# Patient Record
Sex: FEMALE | Race: WHITE | Employment: OTHER | URBAN - METROPOLITAN AREA
[De-identification: names, ages, dates, MRNs, and addresses within clinical notes are randomized per-mention and may not be internally consistent; named-entity substitution may affect disease eponyms.]

---

## 2021-09-27 ENCOUNTER — OFFICE VISIT (OUTPATIENT)
Dept: OBSTETRICS AND GYNECOLOGY | Facility: CLINIC | Age: 66
End: 2021-09-27

## 2021-09-27 VITALS
HEIGHT: 65 IN | BODY MASS INDEX: 28.32 KG/M2 | WEIGHT: 170 LBS | SYSTOLIC BLOOD PRESSURE: 131 MMHG | HEART RATE: 66 BPM | DIASTOLIC BLOOD PRESSURE: 82 MMHG

## 2021-09-27 DIAGNOSIS — N81.4 PELVIC RELAXATION DUE TO UTEROVAGINAL PROLAPSE: Primary | ICD-10-CM

## 2021-09-27 LAB
GLUCOSE UR STRIP-MCNC: NEGATIVE MG/DL
PROT UR STRIP-MCNC: NEGATIVE MG/DL

## 2021-09-27 PROCEDURE — 99203 OFFICE O/P NEW LOW 30 MIN: CPT | Performed by: OBSTETRICS & GYNECOLOGY

## 2021-09-27 PROCEDURE — 81002 URINALYSIS NONAUTO W/O SCOPE: CPT | Performed by: OBSTETRICS & GYNECOLOGY

## 2021-09-27 NOTE — PROGRESS NOTES
"NEW GYN Visit    CC:bulge in vagina    HPI:   66 y.o.  s/p menapause since age 44 presents c/o of something coming out of vagina and vaginal varicosity.  Pt stats she has had 4 's largest 9lb 6oz.  She denies stress urinary incontinence or splinting with bowel movements.  She stats they moved from [facility] in .  She did a lot of lifting and packing boxes.  She states that since then she has noticed something coming out of vagina.  She states her last pap smear was 2020 and it was normal.  She denies any history of abnormal pap smears.                   History: PMHx, Meds, Allergies, PSHx, Social Hx, and POBHx all reviewed and updated.    /82 (BP Location: Right arm, Patient Position: Sitting, Cuff Size: Adult)   Pulse 66   Ht 165.1 cm (65\")   Wt 77.1 kg (170 lb)   LMP  (LMP Unknown) Comment:   BMI 28.29 kg/m²     Physical Exam  Vitals and nursing note reviewed. Exam conducted with a chaperone present.   Constitutional:       Appearance: Normal appearance.   Neck:      Thyroid: No thyroid mass or thyromegaly.   Cardiovascular:      Rate and Rhythm: Regular rhythm.      Heart sounds: No murmur heard.     Pulmonary:      Effort: Pulmonary effort is normal.      Breath sounds: No wheezing.   Abdominal:      General: There is no distension.      Palpations: Abdomen is soft. There is no mass.      Tenderness: There is no abdominal tenderness.   Genitourinary:     Comments: Nml female external genitalia. Cervix is visible at introitus. Grade II-III cystocoele. Grade II rectocoele.  The vagina is slightly pale and atrophic.  She has varicosities just below the urethra.Uterus is axial normal size, shape and consistency.  Skin:     General: Skin is warm and dry.   Neurological:      Mental Status: She is alert and oriented to person, place, and time.   Psychiatric:         Mood and Affect: Mood normal.         Behavior: Behavior normal.         Thought Content: Thought content normal. "           ASSESSMENT AND PLAN:  Diagnoses and all orders for this visit:        1. Pelvic relaxation due to uterovaginal prolapse (Primary)  -     POC Urinalysis Dipstick  -     Ambulatory Referral to Gynecologic Urology        Counseling: discussed with patient the option of pessary vs surgical intervention.  I believe patient is a good surgical candidate and will refer to urogynecology        Follow Up:  No follow-ups on file.          Katelynn Pena,   09/27/2021

## 2021-10-14 ENCOUNTER — TELEPHONE (OUTPATIENT)
Dept: OBSTETRICS AND GYNECOLOGY | Facility: CLINIC | Age: 66
End: 2021-10-14